# Patient Record
Sex: MALE | Race: WHITE | NOT HISPANIC OR LATINO | Employment: FULL TIME | URBAN - METROPOLITAN AREA
[De-identification: names, ages, dates, MRNs, and addresses within clinical notes are randomized per-mention and may not be internally consistent; named-entity substitution may affect disease eponyms.]

---

## 2020-10-29 ENCOUNTER — OFFICE VISIT (OUTPATIENT)
Dept: FAMILY MEDICINE CLINIC | Facility: CLINIC | Age: 60
End: 2020-10-29

## 2020-10-29 VITALS
HEART RATE: 81 BPM | DIASTOLIC BLOOD PRESSURE: 80 MMHG | SYSTOLIC BLOOD PRESSURE: 136 MMHG | WEIGHT: 180 LBS | OXYGEN SATURATION: 97 % | BODY MASS INDEX: 25.77 KG/M2 | TEMPERATURE: 98.2 F | HEIGHT: 70 IN

## 2020-10-29 DIAGNOSIS — I10 HYPERTENSION, ESSENTIAL: ICD-10-CM

## 2020-10-29 DIAGNOSIS — E78.2 HYPERLIPEMIA, MIXED: ICD-10-CM

## 2020-10-29 DIAGNOSIS — Z00.00 PHYSICAL EXAM: Primary | ICD-10-CM

## 2020-10-29 DIAGNOSIS — E11.9 TYPE 2 DIABETES MELLITUS WITHOUT COMPLICATION, WITHOUT LONG-TERM CURRENT USE OF INSULIN (HCC): ICD-10-CM

## 2020-10-29 DIAGNOSIS — N18.30 CHRONIC RENAL INSUFFICIENCY, STAGE 3 (MODERATE) (HCC): ICD-10-CM

## 2020-10-29 PROCEDURE — 99386 PREV VISIT NEW AGE 40-64: CPT | Performed by: FAMILY MEDICINE

## 2020-10-29 NOTE — PROGRESS NOTES
Chief Complaint   Patient presents with   • Establish Care       Subjective   Carlos Eduardo Corrales is a 60 y.o. male.     History of Present Illness   PT is here to reestablish care after not seeing me for over 4 years.  He has a kidney doctor and he has a diabetic doctor- Dr Chang.    He needs a CPE today.  DM his last a1c was 9-2-2020:  7.4%  Hyperlipidemia- No myalgia.  No Complaints.  Stable.   He is getting left total knee on 11/17/2020 and needs a clearance  Hypertension-no chest pain, blurry vision, headaches or palpitations.    The following portions of the patient's history were reviewed and updated as appropriate: allergies, current medications, past family history, past medical history, past social history, past surgical history and problem list.    Review of Systems   Constitutional: Negative for fatigue.   Eyes: Negative for blurred vision.   Respiratory: Negative for cough, chest tightness and shortness of breath.    Cardiovascular: Negative for chest pain, palpitations and leg swelling.   Neurological: Negative for dizziness, light-headedness and headache.       Patient Active Problem List   Diagnosis   • Physical exam   • Type 2 diabetes mellitus without complication, without long-term current use of insulin (CMS/[facility])   • Chronic renal insufficiency, stage 3 (moderate)   • Hypertension, essential   • Hyperlipemia, mixed       Allergies   Allergen Reactions   • Iodinated Diagnostic Agents Shortness Of Breath         Current Outpatient Medications:   •  atorvastatin (LIPITOR) 40 MG tablet, TAKE 1 TABLET BY MOUTH EVERY NIGHT, Disp: , Rfl:   •  Cholecalciferol (VITAMIN D3) 25 MCG (1000 UT) capsule, Take  by mouth., Disp: , Rfl:   •  [employer] Lancets Thin 26G misc, Inject 1 each under the skin into the appropriate area as directed., Disp: , Rfl:   •  fenofibrate 160 MG tablet, Take 160 mg by mouth Daily., Disp: , Rfl:   •  glipizide (GLUCOTROL) 5 MG tablet, Take 5 mg by mouth., Disp: , Rfl:   •  Glucosamine 500  "MG capsule, Take  by mouth., Disp: , Rfl:   •  glucose blood (OneTouch Verio) test strip, USE AS DIRECTED FOR TESTING BLOOD SUGAR TWICE A DAY, Disp: , Rfl:   •  hydroCHLOROthiazide (HYDRODIURIL) 25 MG tablet, Take 25 mg by mouth Daily., Disp: , Rfl:   •  Insulin Pen Needle (PEN NEEDLES 3/16\") 31G X 5 MM misc, Inject 1 each under the skin into the appropriate area as directed., Disp: , Rfl:   •  lisinopril (PRINIVIL,ZESTRIL) 5 MG tablet, TAKE 1 TABLET BY MOUTH DAILY FOR 90 DAYS, Disp: , Rfl:   •  Omega-3 Fatty Acids (FISH OIL) 1000 MG capsule capsule, Take  by mouth., Disp: , Rfl:   •  Riboflavin (VITAMIN B-2) 100 MG tablet, Take  by mouth., Disp: , Rfl:   •  SITagliptin (JANUVIA) 50 MG tablet, Take 50 mg by mouth Daily., Disp: , Rfl:   •  vitamin E 400 UNIT capsule, Take 400 Units by mouth Daily., Disp: , Rfl:   •  Empagliflozin 25 MG tablet, Take 25 mg by mouth Daily., Disp: , Rfl:   •  sitaGLIPtin-metFORMIN (Janumet)  MG per tablet, TAKE 1 TABLET BY MOUTH 2 (TWO) TIMES DAILY WITH MEALS FOR 90 DAYS, Disp: , Rfl:     Past Medical History:   Diagnosis Date   • Diabetes 1.5, managed as type 2 (CMS/[facility])        History reviewed. No pertinent surgical history.    History reviewed. No pertinent family history.    Social History     Tobacco Use   • Smoking status: Never Smoker   • Smokeless tobacco: Never Used   Substance Use Topics   • Alcohol use: Not Currently            Objective     Visit Vitals  /80   Pulse 81   Temp 98.2 °F (36.8 °C)   Ht 177.8 cm (70\")   Wt 81.6 kg (180 lb)   SpO2 97%   BMI 25.83 kg/m²       Physical Exam  Vitals signs and nursing note reviewed.   Constitutional:       General: He is not in acute distress.     Appearance: Normal appearance. He is well-developed and normal weight. He is not ill-appearing, toxic-appearing or diaphoretic.   HENT:      Head: Normocephalic and atraumatic.      Right Ear: Tympanic membrane, ear canal and external ear normal. There is no impacted cerumen.     "  Left Ear: Tympanic membrane, ear canal and external ear normal. There is no impacted cerumen.      Nose: Nose normal. No congestion or rhinorrhea.      Mouth/Throat:      Mouth: Mucous membranes are moist.      Pharynx: Oropharynx is clear. No oropharyngeal exudate or posterior oropharyngeal erythema.   Eyes:      General: No scleral icterus.        Right eye: No discharge.         Left eye: No discharge.      Extraocular Movements: Extraocular movements intact.      Conjunctiva/sclera: Conjunctivae normal.      Pupils: Pupils are equal, round, and reactive to light.   Neck:      Musculoskeletal: Normal range of motion and neck supple. No neck rigidity or muscular tenderness.      Thyroid: No thyromegaly.      Vascular: No carotid bruit.      Trachea: No tracheal deviation.   Cardiovascular:      Rate and Rhythm: Normal rate and regular rhythm.      Heart sounds: Normal heart sounds. No murmur. No friction rub. No gallop.    Pulmonary:      Effort: Pulmonary effort is normal. No respiratory distress.      Breath sounds: Normal breath sounds. No stridor. No wheezing, rhonchi or rales.   Chest:      Chest wall: No tenderness.   Abdominal:      General: Bowel sounds are normal. There is no distension.      Palpations: Abdomen is soft. There is no mass.      Tenderness: There is no abdominal tenderness. There is no right CVA tenderness, left CVA tenderness, guarding or rebound.      Hernia: No hernia is present.   Musculoskeletal: Normal range of motion.         General: No swelling, tenderness, deformity or signs of injury.      Right lower leg: No edema.      Left lower leg: No edema.   Lymphadenopathy:      Cervical: No cervical adenopathy.   Skin:     General: Skin is warm and dry.      Coloration: Skin is not jaundiced or pale.      Findings: No bruising, erythema or lesion.   Neurological:      General: No focal deficit present.      Mental Status: He is alert and oriented to person, place, and time.       Sensory: No sensory deficit.      Motor: No weakness.      Coordination: Coordination normal.      Gait: Gait normal.      Deep Tendon Reflexes: Reflexes normal.   Psychiatric:         Mood and Affect: Mood normal.         Behavior: Behavior normal.         Thought Content: Thought content normal.         Judgment: Judgment normal.         No results found for: GLUCOSE, BUN, CREATININE, EGFRIFNONA, EGFRIFAFRI, BCR, K, CO2, CALCIUM, PROTENTOTREF, ALBUMIN, LABIL2, BILIRUBIN, AST, ALT    WBC   Date Value Ref Range Status   10/19/2020 4.53 4.5 - 11.0 10*3/uL Final     RBC   Date Value Ref Range Status   10/19/2020 4.30 (L) 4.5 - 5.9 10*6/uL Final     Hemoglobin   Date Value Ref Range Status   10/19/2020 12.8 (L) 13.5 - 17.5 g/dL Final     Hematocrit   Date Value Ref Range Status   10/19/2020 36.4 (L) 41.0 - 53.0 % Final     MCV   Date Value Ref Range Status   10/19/2020 84.7 80.0 - 100.0 fL Final     MCH   Date Value Ref Range Status   10/19/2020 29.8 26.0 - 34.0 pg Final     MCHC   Date Value Ref Range Status   10/19/2020 35.2 31.0 - 37.0 g/dL Final     RDW   Date Value Ref Range Status   10/19/2020 12.9 12.0 - 16.8 % Final     MPV   Date Value Ref Range Status   10/19/2020 9.5 8.4 - 12.4 fL Final     Platelets   Date Value Ref Range Status   10/19/2020 201 140 - 440 10*3/uL Final     Neutrophil Rel %   Date Value Ref Range Status   10/19/2020 57.9 45 - 80 % Final     Lymphocyte Rel %   Date Value Ref Range Status   10/19/2020 29.6 15 - 50 % Final     Monocyte Rel %   Date Value Ref Range Status   10/19/2020 6.6 0 - 15 % Final     Eosinophil %   Date Value Ref Range Status   10/19/2020 5.3 0 - 7 % Final     Basophil Rel %   Date Value Ref Range Status   10/19/2020 0.4 0 - 2 % Final     Immature Grans %   Date Value Ref Range Status   10/19/2020 0.2 0.0 - 1.0 % Final     Neutrophils Absolute   Date Value Ref Range Status   10/19/2020 2.62 2.0 - 8.8 10*3/uL Final     Lymphocytes Absolute   Date Value Ref Range Status    10/19/2020 1.34 0.7 - 5.5 10*3/uL Final     Monocytes Absolute   Date Value Ref Range Status   10/19/2020 0.30 0.0 - 1.7 10*3/uL Final     Eosinophils Absolute   Date Value Ref Range Status   10/19/2020 0.24 0.0 - 0.8 10*3/uL Final     Basophils Absolute   Date Value Ref Range Status   10/19/2020 0.02 0.0 - 0.2 10*3/uL Final     Immature Grans, Absolute   Date Value Ref Range Status   10/19/2020 0.01 0.00 - 0.10 10*3/uL Final     nRBC   Date Value Ref Range Status   10/19/2020 0 0 /100(WBC) Final       Lab Results   Component Value Date    HGBA1C 7.9 (H) 10/19/2020           Procedures    Assessment/Plan   Problems Addressed this Visit        Cardiovascular and Mediastinum    Hypertension, essential    Hyperlipemia, mixed       Endocrine    Type 2 diabetes mellitus without complication, without long-term current use of insulin (CMS/[facility])    Relevant Medications    SITagliptin (JANUVIA) 50 MG tablet       Genitourinary    Chronic renal insufficiency, stage 3 (moderate)       Other    Physical exam - Primary      Diagnoses       Codes Comments    Physical exam    -  Primary ICD-10-CM: Z00.00  ICD-9-CM: V70.9     Chronic renal insufficiency, stage 3 (moderate)     ICD-10-CM: N18.30  ICD-9-CM: 585.3     Type 2 diabetes mellitus without complication, without long-term current use of insulin (CMS/[facility])     ICD-10-CM: E11.9  ICD-9-CM: 250.00     Hyperlipemia, mixed     ICD-10-CM: E78.2  ICD-9-CM: 272.2     Hypertension, essential     ICD-10-CM: I10  ICD-9-CM: 401.9           No orders of the defined types were placed in this encounter.      Current Outpatient Medications   Medication Sig Dispense Refill   • atorvastatin (LIPITOR) 40 MG tablet TAKE 1 TABLET BY MOUTH EVERY NIGHT     • Cholecalciferol (VITAMIN D3) 25 MCG (1000 UT) capsule Take  by mouth.     • [employer] Lancets Thin 26G misc Inject 1 each under the skin into the appropriate area as directed.     • fenofibrate 160 MG tablet Take 160 mg by mouth Daily.     •  "glipizide (GLUCOTROL) 5 MG tablet Take 5 mg by mouth.     • Glucosamine 500 MG capsule Take  by mouth.     • glucose blood (OneTouch Verio) test strip USE AS DIRECTED FOR TESTING BLOOD SUGAR TWICE A DAY     • hydroCHLOROthiazide (HYDRODIURIL) 25 MG tablet Take 25 mg by mouth Daily.     • Insulin Pen Needle (PEN NEEDLES 3/16\") 31G X 5 MM misc Inject 1 each under the skin into the appropriate area as directed.     • lisinopril (PRINIVIL,ZESTRIL) 5 MG tablet TAKE 1 TABLET BY MOUTH DAILY FOR 90 DAYS     • Omega-3 Fatty Acids (FISH OIL) 1000 MG capsule capsule Take  by mouth.     • Riboflavin (VITAMIN B-2) 100 MG tablet Take  by mouth.     • SITagliptin (JANUVIA) 50 MG tablet Take 50 mg by mouth Daily.     • vitamin E 400 UNIT capsule Take 400 Units by mouth Daily.     • Empagliflozin 25 MG tablet Take 25 mg by mouth Daily.     • sitaGLIPtin-metFORMIN (Janumet)  MG per tablet TAKE 1 TABLET BY MOUTH 2 (TWO) TIMES DAILY WITH MEALS FOR 90 DAYS       No current facility-administered medications for this visit.      CPE done and reviewed labs with pt.    Continue to work on diet and exercise.  No follow-ups on file.  Cleared for surgery as inga as ekg is normal.  There are no Patient Instructions on file for this visit.         "

## 2021-03-19 ENCOUNTER — BULK ORDERING (OUTPATIENT)
Dept: CASE MANAGEMENT | Facility: OTHER | Age: 61
End: 2021-03-19

## 2021-03-19 DIAGNOSIS — Z23 IMMUNIZATION DUE: ICD-10-CM
